# Patient Record
Sex: MALE | Race: WHITE | NOT HISPANIC OR LATINO | URBAN - METROPOLITAN AREA
[De-identification: names, ages, dates, MRNs, and addresses within clinical notes are randomized per-mention and may not be internally consistent; named-entity substitution may affect disease eponyms.]

---

## 2024-02-07 ENCOUNTER — OV NP (OUTPATIENT)
Dept: URBAN - METROPOLITAN AREA CLINIC 70 | Facility: CLINIC | Age: 79
End: 2024-02-07

## 2024-02-07 VITALS
SYSTOLIC BLOOD PRESSURE: 149 MMHG | BODY MASS INDEX: 21.47 KG/M2 | DIASTOLIC BLOOD PRESSURE: 66 MMHG | HEART RATE: 69 BPM | WEIGHT: 133.6 LBS | HEIGHT: 66 IN | TEMPERATURE: 97.5 F

## 2024-02-07 DIAGNOSIS — R10.10 PAIN OF UPPER ABDOMEN: ICD-10-CM

## 2024-02-07 PROCEDURE — 99204 OFFICE O/P NEW MOD 45 MIN: CPT | Performed by: NURSE PRACTITIONER

## 2024-02-07 RX ORDER — PANTOPRAZOLE SODIUM 40 MG/1
TAKE 1 TABLET BY MOUTH EVERY DAY TABLET, DELAYED RELEASE ORAL
Qty: 90 EACH | Refills: 0 | Status: ACTIVE | COMMUNITY

## 2024-02-07 RX ORDER — SUCRALFATE 1 G/1
TABLET ORAL
Qty: 120 TABLET | Status: ACTIVE | COMMUNITY

## 2024-02-07 RX ORDER — PANTOPRAZOLE SODIUM 40 MG/1
1 TABLET TABLET, DELAYED RELEASE ORAL ONCE A DAY
Qty: 90 TABLET | Refills: 1

## 2024-02-07 RX ORDER — METOPROLOL SUCCINATE 25 MG/1
TABLET, EXTENDED RELEASE ORAL
Qty: 30 TABLET | Status: ACTIVE | COMMUNITY

## 2024-02-07 RX ORDER — ATORVASTATIN CALCIUM 40 MG/1
TAKE 1 TABLET BY MOUTH EVERY DAY TABLET ORAL
Qty: 90 EACH | Refills: 0 | Status: ACTIVE | COMMUNITY

## 2024-02-07 NOTE — HPI-TODAY'S VISIT:
Patient is a 79 y/o male with PMH of CLL and PAD who presents today as a referral for GERD however he states he is here for abdominal pain. Pain is located in upper periumbilical/epigastric area. He recently had a vascular intervention with complication with ER visit 1/23/24 for post-procedure abdominal pain with CTA of abdomen/pelvis showing a large abdominal wall hematoma with acute drop in H/H. He is currently holding anticoagulation and has follow up with vascular interventionalist next week (Dr. Awad). He was given pantoprazole by PCP but has not started taking medication. No GI signs of bleeding. Denies fever, CP, SOB, wt loss, constipation, diarrhea, or N/V.

## 2024-06-06 ENCOUNTER — DASHBOARD ENCOUNTERS (OUTPATIENT)
Age: 79
End: 2024-06-06

## 2024-06-06 ENCOUNTER — OFFICE VISIT (OUTPATIENT)
Dept: URBAN - METROPOLITAN AREA CLINIC 70 | Facility: CLINIC | Age: 79
End: 2024-06-06
Payer: COMMERCIAL

## 2024-06-06 VITALS
SYSTOLIC BLOOD PRESSURE: 145 MMHG | HEIGHT: 66 IN | TEMPERATURE: 97.9 F | WEIGHT: 129 LBS | HEART RATE: 54 BPM | DIASTOLIC BLOOD PRESSURE: 63 MMHG | BODY MASS INDEX: 20.73 KG/M2

## 2024-06-06 DIAGNOSIS — R10.10 PAIN OF UPPER ABDOMEN: ICD-10-CM

## 2024-06-06 PROCEDURE — 99213 OFFICE O/P EST LOW 20 MIN: CPT | Performed by: NURSE PRACTITIONER

## 2024-06-06 RX ORDER — ALBUTEROL SULFATE 90 UG/1
INHALE 2 PUFFS BY MOUTH EVERY 4 HOURS AS NEEDED AEROSOL, METERED RESPIRATORY (INHALATION)
Qty: 8.5 GRAM | Refills: 0 | Status: ACTIVE | COMMUNITY

## 2024-06-06 RX ORDER — ATORVASTATIN CALCIUM 40 MG/1
TAKE 1 TABLET BY MOUTH EVERY DAY TABLET ORAL
Qty: 90 EACH | Refills: 0 | Status: ACTIVE | COMMUNITY

## 2024-06-06 RX ORDER — METOPROLOL SUCCINATE 25 MG/1
TABLET, EXTENDED RELEASE ORAL
Qty: 30 TABLET | Status: ACTIVE | COMMUNITY

## 2024-06-06 RX ORDER — SUCRALFATE 1 G/1
TABLET ORAL
Qty: 120 TABLET | Status: ON HOLD | COMMUNITY

## 2024-06-06 RX ORDER — PANTOPRAZOLE SODIUM 40 MG/1
1 TABLET TABLET, DELAYED RELEASE ORAL ONCE A DAY
Qty: 90 TABLET | Refills: 1 | Status: ACTIVE | COMMUNITY

## 2024-06-06 NOTE — HPI-TODAY'S VISIT:
OV 2/7/24: Patient is a 77 y/o male with PMH of CLL and PAD who presents today as a referral for GERD however he states he is here for abdominal pain. Pain is located in upper periumbilical/epigastric area. He recently had a vascular intervention with complication with ER visit 1/23/24 for post-procedure abdominal pain with CTA of abdomen/pelvis showing a large abdominal wall hematoma with acute drop in H/H. He is currently holding anticoagulation and has follow up with vascular interventionalist next week (Dr. Awad). He was given pantoprazole by PCP but has not started taking medication. No GI signs of bleeding. Denies fever, CP, SOB, wt loss, constipation, diarrhea, or N/V. ------------------- Today 6/6/24: Patient presents today for follow up. He reports doing well. States abdominal hematoma has now resolved with prior abodminal pain also resolved. No current GI complaints or concerns.

## 2024-09-23 ENCOUNTER — LAB OUTSIDE AN ENCOUNTER (OUTPATIENT)
Dept: URBAN - METROPOLITAN AREA CLINIC 70 | Facility: CLINIC | Age: 79
End: 2024-09-23

## 2024-09-23 ENCOUNTER — OFFICE VISIT (OUTPATIENT)
Dept: URBAN - METROPOLITAN AREA CLINIC 70 | Facility: CLINIC | Age: 79
End: 2024-09-23
Payer: COMMERCIAL

## 2024-09-23 VITALS
TEMPERATURE: 97.7 F | HEART RATE: 84 BPM | SYSTOLIC BLOOD PRESSURE: 148 MMHG | BODY MASS INDEX: 20.51 KG/M2 | HEIGHT: 66 IN | WEIGHT: 127.6 LBS | DIASTOLIC BLOOD PRESSURE: 84 MMHG

## 2024-09-23 DIAGNOSIS — R10.13 ABDOMINAL DISCOMFORT, EPIGASTRIC: ICD-10-CM

## 2024-09-23 PROCEDURE — 99214 OFFICE O/P EST MOD 30 MIN: CPT | Performed by: NURSE PRACTITIONER

## 2024-09-23 RX ORDER — SUCRALFATE 1 G/1
TABLET ORAL
Qty: 120 TABLET | Status: ON HOLD | COMMUNITY

## 2024-09-23 RX ORDER — PANTOPRAZOLE SODIUM 40 MG/1
1 TABLET TABLET, DELAYED RELEASE ORAL ONCE A DAY
Qty: 90 TABLET | Refills: 1 | Status: ACTIVE | COMMUNITY

## 2024-09-23 RX ORDER — ATORVASTATIN CALCIUM 40 MG/1
TAKE 1 TABLET BY MOUTH EVERY DAY TABLET ORAL
Qty: 90 EACH | Refills: 0 | Status: ACTIVE | COMMUNITY

## 2024-09-23 RX ORDER — ALBUTEROL SULFATE 90 UG/1
INHALE 2 PUFFS BY MOUTH EVERY 4 HOURS AS NEEDED AEROSOL, METERED RESPIRATORY (INHALATION)
Qty: 8.5 GRAM | Refills: 0 | Status: ACTIVE | COMMUNITY

## 2024-09-23 RX ORDER — PANTOPRAZOLE SODIUM 40 MG/1
1 TABLET TABLET, DELAYED RELEASE ORAL ONCE A DAY
OUTPATIENT

## 2024-09-23 RX ORDER — METOPROLOL SUCCINATE 25 MG/1
TABLET, EXTENDED RELEASE ORAL
Qty: 30 TABLET | Status: ACTIVE | COMMUNITY

## 2024-09-23 NOTE — HPI-TODAY'S VISIT:
OV 2/7/24: Patient is a 77 y/o male with PMH of CLL and PAD who presents today as a referral for GERD however he states he is here for abdominal pain. Pain is located in upper periumbilical/epigastric area. He recently had a vascular intervention with complication with ER visit 1/23/24 for post-procedure abdominal pain with CTA of abdomen/pelvis showing a large abdominal wall hematoma with acute drop in H/H. He is currently holding anticoagulation and has follow up with vascular interventionalist next week (Dr. Awad). He was given pantoprazole by PCP but has not started taking medication. No GI signs of bleeding. Denies fever, CP, SOB, wt loss, constipation, diarrhea, or N/V. - - - - - - - - - - OV 6/6/24: Patient presents today for follow up. He reports doing well. States abdominal hematoma has now resolved with prior abodminal pain also resolved. No current GI complaints or concerns. - - - - - - - - -- Today 9/23/24: Patient presents today with recurrent intermittent upper abdominal pain that is predominately in epigastric/RUQ. On daily PPI. He is unsure if he is still on anticoagulation other than ASA. No prior EGD. Denies CP, SoB, wt loss, N/V, dysphagia, persistent constipation/diarrhea, or signs of GI bleeding.